# Patient Record
Sex: FEMALE | Race: WHITE | NOT HISPANIC OR LATINO | URBAN - METROPOLITAN AREA
[De-identification: names, ages, dates, MRNs, and addresses within clinical notes are randomized per-mention and may not be internally consistent; named-entity substitution may affect disease eponyms.]

---

## 2021-09-30 ENCOUNTER — EMERGENCY (EMERGENCY)
Facility: HOSPITAL | Age: 31
LOS: 0 days | Discharge: HOME | End: 2021-10-01
Attending: EMERGENCY MEDICINE | Admitting: EMERGENCY MEDICINE
Payer: COMMERCIAL

## 2021-09-30 VITALS
RESPIRATION RATE: 18 BRPM | OXYGEN SATURATION: 99 % | DIASTOLIC BLOOD PRESSURE: 84 MMHG | SYSTOLIC BLOOD PRESSURE: 138 MMHG | HEIGHT: 66 IN | HEART RATE: 96 BPM | TEMPERATURE: 98 F | WEIGHT: 184.09 LBS

## 2021-09-30 DIAGNOSIS — J45.909 UNSPECIFIED ASTHMA, UNCOMPLICATED: ICD-10-CM

## 2021-09-30 DIAGNOSIS — R26.2 DIFFICULTY IN WALKING, NOT ELSEWHERE CLASSIFIED: ICD-10-CM
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
## 2021-09-30 DIAGNOSIS — S90.212A CONTUSION OF LEFT GREAT TOE WITH DAMAGE TO NAIL, INITIAL ENCOUNTER: ICD-10-CM
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
## 2021-09-30 DIAGNOSIS — M79.675 PAIN IN LEFT TOE(S): ICD-10-CM

## 2021-09-30 PROCEDURE — 11740 EVACUATION SUBUNGUAL HMTMA: CPT | Mod: TA

## 2021-09-30 PROCEDURE — 73630 X-RAY EXAM OF FOOT: CPT | Mod: 26,LT

## 2021-09-30 PROCEDURE — 99283 EMERGENCY DEPT VISIT LOW MDM: CPT | Mod: 25

## 2021-09-30 RX ORDER — IBUPROFEN 200 MG
600 TABLET ORAL ONCE
Refills: 0 | Status: COMPLETED | OUTPATIENT
Start: 2021-09-30 | End: 2021-09-30

## 2021-09-30 RX ADMIN — Medication 600 MILLIGRAM(S): at 22:53

## 2021-09-30 NOTE — ED PROVIDER NOTE - NS ED ROS FT
no Constitutional: no fever, chills, no recent weight loss, change in appetite or malaise  MS: see HPI  Neuro: No headache or weakness. No LOC.  Skin: No skin rash.  Endocrine: No history of thyroid disease or diabetes.

## 2021-09-30 NOTE — ED PROVIDER NOTE - NSFOLLOWUPINSTRUCTIONS_ED_ALL_ED_FT
Subungual Hematoma    WHAT YOU NEED TO KNOW:    A subungual hematoma is a collection of blood under your fingernail or toenail.    DISCHARGE INSTRUCTIONS:    Call your doctor if:   •You have increased redness, swelling, or pain.  •You notice pus or a bad smell coming from your nail.  •You see red streaks on your finger or toe that starts from your nail.  •Your nail falls off and there is bleeding.  •You have questions or concerns about your condition or care.    Medicines: You may need any of the following:   •Acetaminophen decreases pain and fever. It is available without a doctor's order. Ask how much to take and how often to take it. Follow directions. Read the labels of all other medicines you are using to see if they also contain acetaminophen, or ask your doctor or pharmacist. Acetaminophen can cause liver damage if not taken correctly. Do not use more than 4 grams (4,000 milligrams) total of acetaminophen in one day.   •NSAIDs, such as ibuprofen, help decrease swelling, pain, and fever. This medicine is available with or without a doctor's order. NSAIDs can cause stomach bleeding or kidney problems in certain people. If you take blood thinner medicine, always ask your healthcare provider if NSAIDs are safe for you. Always read the medicine label and follow directions.  •Take your medicine as directed. Contact your healthcare provider if you think your medicine is not helping or if you have side effects. Tell him of her if you are allergic to any medicine. Keep a list of the medicines, vitamins, and herbs you take. Include the amounts, and when and why you take them. Bring the list or the pill bottles to follow-up visits. Carry your medicine list with you in case of an emergency.    Self-care:   •Apply ice on your finger or toe for 15 to 20 minutes every hour or as directed. Use an ice pack, or put crushed ice in a plastic bag. Cover it with a towel. Ice helps prevent tissue damage and decreases swelling and pain.  •Elevate your finger or toe above the level of your heart as often as you can. This will help decrease swelling and pain. Prop your finger or toe on pillows or blankets to keep it elevated comfortably.   •Gently trim your nail if it begins to fall off in pieces. This may decrease your risk for catching the nail on an object or ripping it off.   Correct way to trim toenails  •Wear comfortable shoes that fit correctly to prevent more injury to your toe.    Follow up with your doctor as directed: Write down your questions so you remember to ask them during your visits.

## 2021-09-30 NOTE — ED ADULT NURSE NOTE - NSIMPLEMENTINTERV_GEN_ALL_ED
Implemented All Universal Safety Interventions:  [place] to call system. Call bell, personal items and telephone within reach. Instruct patient to call for assistance. Room bathroom lighting operational. Non-slip footwear when patient is off stretcher. Physically safe environment: no spills, clutter or unnecessary equipment. Stretcher in lowest position, wheels locked, appropriate side rails in place.

## 2021-09-30 NOTE — ED PROVIDER NOTE - PATIENT PORTAL LINK FT
You can access the FollowMyHealth Patient Portal offered by [facility] by registering at the following website: [email or web address]. By joining [employer]’s FollowMyHealth portal, you will also be able to view your health information using other applications (apps) compatible with our system.

## 2021-09-30 NOTE — ED ADULT NURSE NOTE - CAS DISCH ACCOMP BY
Appointment rescheduled to 8/1/18 to discuss meds. Patient aware, states understanding [facility]/MA     uncle

## 2021-09-30 NOTE — ED PROVIDER NOTE - PHYSICAL EXAMINATION
CONSTITUTIONAL: Well-appearing; well-nourished; in no apparent distress.   MS: painful ROM to left big toe. +tenderness to left big toe. 2+ DP pulses. left foot nv intact.    SKIN: ~75% subungual hematoma noted under left bog toe toenail.   NEURO/PSYCH: A & O x 4; grossly unremarkable.

## 2021-09-30 NOTE — ED PROVIDER NOTE - CLINICAL SUMMARY MEDICAL DECISION MAKING FREE TEXT BOX
Healthy 29 yo F here for L great toe pain, subungual hematoma after dropping an iron candlestick on her toe -- XR negative, trephonation performed with evacuation of large subungual. Pain improved. Advised on ice, rest, follow up, return precautions.

## 2021-09-30 NOTE — ED PROVIDER NOTE - OBJECTIVE STATEMENT
29 yo female no sig hx present c/o left great toe pain for 2 hours s/p she dropped a heavy candle paula on it. pain worsen with movement and ambulation. denies denies numbness/weakness to left foot.

## 2021-10-01 NOTE — ED PROCEDURE NOTE - GENERAL PROCEDURE DETAILS
area cleansed with betadine. digital block used for anesthesia. trephination to left big toe toenail. hematoma released. covered with clean dry dressing.

## 2021-10-26 ENCOUNTER — EMERGENCY (EMERGENCY)
Facility: HOSPITAL | Age: 31
LOS: 0 days | Discharge: HOME | End: 2021-10-26
Attending: EMERGENCY MEDICINE | Admitting: EMERGENCY MEDICINE
Payer: COMMERCIAL

## 2021-10-26 VITALS
TEMPERATURE: 98 F | HEART RATE: 68 BPM | DIASTOLIC BLOOD PRESSURE: 62 MMHG | SYSTOLIC BLOOD PRESSURE: 102 MMHG | RESPIRATION RATE: 18 BRPM | OXYGEN SATURATION: 96 %

## 2021-10-26 VITALS
HEART RATE: 80 BPM | TEMPERATURE: 98 F | DIASTOLIC BLOOD PRESSURE: 89 MMHG | OXYGEN SATURATION: 99 % | SYSTOLIC BLOOD PRESSURE: 136 MMHG | RESPIRATION RATE: 18 BRPM | HEIGHT: 66 IN

## 2021-10-26 DIAGNOSIS — J35.1 HYPERTROPHY OF TONSILS: ICD-10-CM

## 2021-10-26 DIAGNOSIS — Z91.018 ALLERGY TO OTHER FOODS: ICD-10-CM

## 2021-10-26 DIAGNOSIS — R05.8 OTHER SPECIFIED COUGH: ICD-10-CM

## 2021-10-26 DIAGNOSIS — J45.909 UNSPECIFIED ASTHMA, UNCOMPLICATED: ICD-10-CM

## 2021-10-26 DIAGNOSIS — J02.9 ACUTE PHARYNGITIS, UNSPECIFIED: ICD-10-CM

## 2021-10-26 DIAGNOSIS — R42 DIZZINESS AND GIDDINESS: ICD-10-CM

## 2021-10-26 LAB
ALBUMIN SERPL ELPH-MCNC: 4.5 G/DL — SIGNIFICANT CHANGE UP (ref 3.5–5.2)
ALP SERPL-CCNC: 78 U/L — SIGNIFICANT CHANGE UP (ref 30–115)
ALT FLD-CCNC: 45 U/L — HIGH (ref 0–41)
ANION GAP SERPL CALC-SCNC: 15 MMOL/L — HIGH (ref 7–14)
AST SERPL-CCNC: 16 U/L — SIGNIFICANT CHANGE UP (ref 0–41)
BASOPHILS # BLD AUTO: 0.04 K/UL — SIGNIFICANT CHANGE UP (ref 0–0.2)
BASOPHILS NFR BLD AUTO: 0.3 % — SIGNIFICANT CHANGE UP (ref 0–1)
BILIRUB SERPL-MCNC: 0.4 MG/DL — SIGNIFICANT CHANGE UP (ref 0.2–1.2)
BUN SERPL-MCNC: 12 MG/DL — SIGNIFICANT CHANGE UP (ref 10–20)
CALCIUM SERPL-MCNC: 9.5 MG/DL — SIGNIFICANT CHANGE UP (ref 8.5–10.1)
CHLORIDE SERPL-SCNC: 100 MMOL/L — SIGNIFICANT CHANGE UP (ref 98–110)
CO2 SERPL-SCNC: 28 MMOL/L — SIGNIFICANT CHANGE UP (ref 17–32)
CREAT SERPL-MCNC: 0.8 MG/DL — SIGNIFICANT CHANGE UP (ref 0.7–1.5)
EOSINOPHIL # BLD AUTO: 0.07 K/UL — SIGNIFICANT CHANGE UP (ref 0–0.7)
EOSINOPHIL NFR BLD AUTO: 0.5 % — SIGNIFICANT CHANGE UP (ref 0–8)
GLUCOSE SERPL-MCNC: 99 MG/DL — SIGNIFICANT CHANGE UP (ref 70–99)
HCT VFR BLD CALC: 45.1 % — SIGNIFICANT CHANGE UP (ref 37–47)
HGB BLD-MCNC: 14.6 G/DL — SIGNIFICANT CHANGE UP (ref 12–16)
IMM GRANULOCYTES NFR BLD AUTO: 0.7 % — HIGH (ref 0.1–0.3)
LYMPHOCYTES # BLD AUTO: 34.2 % — SIGNIFICANT CHANGE UP (ref 20.5–51.1)
LYMPHOCYTES # BLD AUTO: 4.41 K/UL — HIGH (ref 1.2–3.4)
MCHC RBC-ENTMCNC: 29.2 PG — SIGNIFICANT CHANGE UP (ref 27–31)
MCHC RBC-ENTMCNC: 32.4 G/DL — SIGNIFICANT CHANGE UP (ref 32–37)
MCV RBC AUTO: 90.2 FL — SIGNIFICANT CHANGE UP (ref 81–99)
MONOCYTES # BLD AUTO: 0.86 K/UL — HIGH (ref 0.1–0.6)
MONOCYTES NFR BLD AUTO: 6.7 % — SIGNIFICANT CHANGE UP (ref 1.7–9.3)
NEUTROPHILS # BLD AUTO: 7.43 K/UL — HIGH (ref 1.4–6.5)
NEUTROPHILS NFR BLD AUTO: 57.6 % — SIGNIFICANT CHANGE UP (ref 42.2–75.2)
NRBC # BLD: 0 /100 WBCS — SIGNIFICANT CHANGE UP (ref 0–0)
PLATELET # BLD AUTO: 281 K/UL — SIGNIFICANT CHANGE UP (ref 130–400)
POTASSIUM SERPL-MCNC: 3.2 MMOL/L — LOW (ref 3.5–5)
POTASSIUM SERPL-SCNC: 3.2 MMOL/L — LOW (ref 3.5–5)
PROT SERPL-MCNC: 7.6 G/DL — SIGNIFICANT CHANGE UP (ref 6–8)
RBC # BLD: 5 M/UL — SIGNIFICANT CHANGE UP (ref 4.2–5.4)
RBC # FLD: 12.1 % — SIGNIFICANT CHANGE UP (ref 11.5–14.5)
SODIUM SERPL-SCNC: 143 MMOL/L — SIGNIFICANT CHANGE UP (ref 135–146)
WBC # BLD: 12.9 K/UL — HIGH (ref 4.8–10.8)
WBC # FLD AUTO: 12.9 K/UL — HIGH (ref 4.8–10.8)

## 2021-10-26 PROCEDURE — 70450 CT HEAD/BRAIN W/O DYE: CPT | Mod: 26,MA

## 2021-10-26 PROCEDURE — 99285 EMERGENCY DEPT VISIT HI MDM: CPT

## 2021-10-26 RX ORDER — ONDANSETRON 8 MG/1
4 TABLET, FILM COATED ORAL ONCE
Refills: 0 | Status: COMPLETED | OUTPATIENT
Start: 2021-10-26 | End: 2021-10-26

## 2021-10-26 RX ORDER — ONDANSETRON 8 MG/1
4 TABLET, FILM COATED ORAL ONCE
Refills: 0 | Status: DISCONTINUED | OUTPATIENT
Start: 2021-10-26 | End: 2021-10-26

## 2021-10-26 RX ORDER — DEXAMETHASONE 0.5 MG/5ML
10 ELIXIR ORAL ONCE
Refills: 0 | Status: COMPLETED | OUTPATIENT
Start: 2021-10-26 | End: 2021-10-26

## 2021-10-26 RX ORDER — KETOROLAC TROMETHAMINE 30 MG/ML
15 SYRINGE (ML) INJECTION ONCE
Refills: 0 | Status: DISCONTINUED | OUTPATIENT
Start: 2021-10-26 | End: 2021-10-26

## 2021-10-26 RX ORDER — MECLIZINE HCL 12.5 MG
50 TABLET ORAL ONCE
Refills: 0 | Status: COMPLETED | OUTPATIENT
Start: 2021-10-26 | End: 2021-10-26

## 2021-10-26 RX ORDER — ACETAMINOPHEN 500 MG
975 TABLET ORAL ONCE
Refills: 0 | Status: COMPLETED | OUTPATIENT
Start: 2021-10-26 | End: 2021-10-26

## 2021-10-26 RX ORDER — SODIUM CHLORIDE 9 MG/ML
2000 INJECTION, SOLUTION INTRAVENOUS ONCE
Refills: 0 | Status: COMPLETED | OUTPATIENT
Start: 2021-10-26 | End: 2021-10-26

## 2021-10-26 RX ORDER — DIPHENHYDRAMINE HCL 50 MG
25 CAPSULE ORAL ONCE
Refills: 0 | Status: DISCONTINUED | OUTPATIENT
Start: 2021-10-26 | End: 2021-10-26

## 2021-10-26 RX ORDER — POTASSIUM CHLORIDE 20 MEQ
30 PACKET (EA) ORAL ONCE
Refills: 0 | Status: COMPLETED | OUTPATIENT
Start: 2021-10-26 | End: 2021-10-26

## 2021-10-26 RX ADMIN — ONDANSETRON 4 MILLIGRAM(S): 8 TABLET, FILM COATED ORAL at 14:27

## 2021-10-26 RX ADMIN — Medication 30 MILLIEQUIVALENT(S): at 19:30

## 2021-10-26 RX ADMIN — Medication 102 MILLIGRAM(S): at 14:27

## 2021-10-26 RX ADMIN — Medication 50 MILLIGRAM(S): at 18:31

## 2021-10-26 RX ADMIN — Medication 975 MILLIGRAM(S): at 19:22

## 2021-10-26 RX ADMIN — SODIUM CHLORIDE 2000 MILLILITER(S): 9 INJECTION, SOLUTION INTRAVENOUS at 14:27

## 2021-10-26 RX ADMIN — Medication 15 MILLIGRAM(S): at 14:27

## 2021-10-26 NOTE — ED PROVIDER NOTE - NS ED ROS FT
CONST: No fever, chills or bodyaches  EYES: No pain, redness, drainage or visual changes.  ENT: + sore throat. No ear pain or discharge, nasal discharge or congestion  CARD: No chest pain, palpitations  RESP: + dry cough. No SOB, hemoptysis.  GI: No abdominal pain, N/V/D  MS: No joint pain, back pain or extremity pain/injury  SKIN: No rashes  NEURO: No headache, dizziness, paresthesias

## 2021-10-26 NOTE — ED PROVIDER NOTE - PHYSICAL EXAMINATION
CONST: Well appearing in NAD  EYES: Sclera and conjunctiva clear.  ENT: mild erythema posterior pharynx, no exudates, uvula midline, clear speech, tolerating secretions, no trismus, TM's clear B/L without drainage.   NECK: Non-tender, no meningeal signs, supple, + anterior cervical lymphadenopathy   CARD: Normal S1 S2; Normal rate and rhythm  RESP: Equal BS B/L, No wheezes, rhonchi or rales. No distress  MS: Normal ROM in all extremities. no calf pain, radial pulses 2+ bilaterally  SKIN: Warm, dry, no acute rashes. Good turgor  NEURO: A&Ox3, No focal deficits. Strength 5/5 with no sensory deficits

## 2021-10-26 NOTE — ED PROVIDER NOTE - PATIENT PORTAL LINK FT
You can access the FollowMyHealth Patient Portal offered by [facility] by registering at the following website: [email or web address]. By joining [employer]’s FollowMyHealth portal, you will also be able to view your health information using other applications (apps) compatible with our system.

## 2021-10-26 NOTE — ED PROVIDER NOTE - CLINICAL SUMMARY MEDICAL DECISION MAKING FREE TEXT BOX
31yF pw sore throat x 1 week currently on amoxcillin  and  prednisone  with persistent pain   in ed  well appearing nontoxic   pharynx  left tonsil enlargement -  (Chronic) no exudate  n parapharyngeal  abscess uvula  midline  no  change in voice  no  neck pain with movement  Pt felt better  after  symptomatic treatment in  ED.   labs reviewed  wbc wnl,   Pt had episode of dizziness after meds ,  Ct head no acute findings  Patient to be discharged from ED in well appearing condition. Any available test results were discussed with and printed  for patient.  Verbal instructions given, including instructions to return to ED immediately for any new, worsening, or concerning symptoms. Limitations of ED work up discussed.  Patient reports understanding of above with capacity and insight. Written discharge instructions additionally given, including follow-up plan.

## 2021-10-26 NOTE — ED PROVIDER NOTE - OBJECTIVE STATEMENT
31 y.o female presents to the ED for evaluation of sore throat x 1 week.  Went to urgent care, given amox.  2 days later was started on prednisone. Today pain worse, had [facility] health appointment and was told to come to the ED for further eval.  Denies fever, chills, trismus, difficulty tolerating secretions, facial swelling, dental pain, changes in speech.

## 2021-10-26 NOTE — ED PROVIDER NOTE - NSFOLLOWUPINSTRUCTIONS_ED_ALL_ED_FT
Detail Level: Detailed Detail Level: Zone Sore Throat  ImageWhen you have a sore throat, your throat may:    Hurt.  Burn.  Feel irritated.  Feel scratchy.    Many things can cause a sore throat, including:    An infection.  Allergies.  Dryness in the air.  Smoke or pollution.  Gastroesophageal reflux disease (GERD).  A tumor.    A sore throat can be the first sign of another sickness. It can happen with other problems, like coughing or a fever. Most sore throats go away without treatment.    Follow these instructions at home:  Take over-the-counter medicines only as told by your doctor.  Drink enough fluids to keep your pee (urine) clear or pale yellow.  Rest when you feel you need to.  To help with pain, try:    Sipping warm liquids, such as broth, herbal tea, or warm water.  Eating or drinking cold or frozen liquids, such as frozen ice pops.  Gargling with a salt-water mixture 3–4 times a day or as needed. To make a salt-water mixture, add ½–1 tsp of salt in 1 cup of warm water. Mix it until you cannot see the salt anymore.  Sucking on hard candy or throat lozenges.  Putting a cool-mist humidifier in your bedroom at night.  Sitting in the bathroom with the door closed for 5–10 minutes while you run hot water in the shower.    Do not use any tobacco products, such as cigarettes, chewing tobacco, and e-cigarettes. If you need help quitting, ask your doctor.  Contact a doctor if:  You have a fever for more than 2–3 days.  You keep having symptoms for more than 2–3 days.  Your throat does not get better in 7 days.  You have a fever and your symptoms suddenly get worse.  Get help right away if:  You have trouble breathing.  You cannot swallow fluids, soft foods, or your saliva.  You have swelling in your throat or neck that gets worse.  You keep feeling like you are going to throw up (vomit).  You keep throwing up.  This information is not intended to replace advice given to you by your health care provider. Make sure you discuss any questions you have with your health care provider        Dizziness  Dizziness is a common problem. It is a feeling of unsteadiness or light-headedness. You may feel like you are about to faint. Dizziness can lead to injury if you stumble or fall. Anyone can become dizzy, but dizziness is more common in older adults. This condition can be caused by a number of things, including medicines, dehydration, or illness.    Follow these instructions at home:  Eating and drinking     Drink enough fluid to keep your urine clear or pale yellow. This helps to keep you from becoming dehydrated. Try to drink more clear fluids, such as water.  Do not drink alcohol.  Limit your caffeine intake if told to do so by your health care provider. Check ingredients and nutrition facts to see if a food or beverage contains caffeine.  Limit your salt (sodium) intake if told to do so by your health care provider. Check ingredients and nutrition facts to see if a food or beverage contains sodium.  Activity     Avoid making quick movements.    Rise slowly from chairs and steady yourself until you feel okay.  In the morning, first sit up on the side of the bed. When you feel okay, stand slowly while you hold onto something until you know that your balance is fine.    If you need to  one place for a long time, move your legs often. Tighten and relax the muscles in your legs while you are standing.  Do not drive or use heavy machinery if you feel dizzy.  Avoid bending down if you feel dizzy. Place items in your home so that they are easy for you to reach without leaning over.  Lifestyle     Do not use any products that contain nicotine or tobacco, such as cigarettes and e-cigarettes. If you need help quitting, ask your health care provider.  Try to reduce your stress level by using methods such as yoga or meditation. Talk with your health care provider if you need help to manage your stress.  General instructions     Watch your dizziness for any changes.  Take over-the-counter and prescription medicines only as told by your health care provider. Talk with your health care provider if you think that your dizziness is caused by a medicine that you are taking.  Tell a friend or a family member that you are feeling dizzy. If he or she notices any changes in your behavior, have this person call your health care provider.  Keep all follow-up visits as told by your health care provider. This is important.  Contact a health care provider if:  Your dizziness does not go away.  Your dizziness or light-headedness gets worse.  You feel nauseous.  You have reduced hearing.  You have new symptoms.  You are unsteady on your feet or you feel like the room is spinning.  Get help right away if:  You vomit or have diarrhea and are unable to eat or drink anything.  You have problems talking, walking, swallowing, or using your arms, hands, or legs.  You feel generally weak.  You are not thinking clearly or you have trouble forming sentences. It may take a friend or family member to notice this.  You have chest pain, abdominal pain, shortness of breath, or sweating.  Your vision changes.  You have any bleeding.  You have a severe headache.  You have neck pain or a stiff neck.  You have a fever.  These symptoms may represent a serious problem that is an emergency. Do not wait to see if the symptoms will go away. Get medical help right away. Call your local emergency services (911 in the [place] Do not drive yourself to the hospital.     Summary  Dizziness is a feeling of unsteadiness or light-headedness. This condition can be caused by a number of things, including medicines, dehydration, or illness.  Anyone can become dizzy, but dizziness is more common in older adults.  Drink enough fluid to keep your urine clear or pale yellow. Do not drink alcohol.  Avoid making quick movements if you feel dizzy. Monitor your dizziness for any changes.  This information is not intended to replace advice given to you by your health care provider. Make sure you discuss any questions you have with your health care provider.    Document Released: 06/13/2002 Document Revised: 01/20/2018 Document Reviewed: 01/20/2018  [employer] Interactive Patient Education © 2018 [employer]

## 2022-03-28 ENCOUNTER — EMERGENCY (EMERGENCY)
Facility: HOSPITAL | Age: 32
LOS: 0 days | Discharge: HOME | End: 2022-03-28
Attending: EMERGENCY MEDICINE | Admitting: EMERGENCY MEDICINE
Payer: COMMERCIAL

## 2022-03-28 VITALS
RESPIRATION RATE: 18 BRPM | DIASTOLIC BLOOD PRESSURE: 96 MMHG | HEIGHT: 66 IN | SYSTOLIC BLOOD PRESSURE: 134 MMHG | OXYGEN SATURATION: 100 % | TEMPERATURE: 97 F | HEART RATE: 116 BPM

## 2022-03-28 DIAGNOSIS — L50.9 URTICARIA, UNSPECIFIED: ICD-10-CM
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
## 2022-03-28 DIAGNOSIS — Z91.018 ALLERGY TO OTHER FOODS: ICD-10-CM

## 2022-03-28 DIAGNOSIS — H93.90 UNSPECIFIED DISORDER OF EAR, UNSPECIFIED EAR: ICD-10-CM
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
## 2022-03-28 DIAGNOSIS — T78.40XA ALLERGY, UNSPECIFIED, INITIAL ENCOUNTER: ICD-10-CM

## 2022-03-28 DIAGNOSIS — J45.909 UNSPECIFIED ASTHMA, UNCOMPLICATED: ICD-10-CM

## 2022-03-28 PROCEDURE — 99284 EMERGENCY DEPT VISIT MOD MDM: CPT

## 2022-03-28 RX ORDER — HYDROCORTISONE 1 %
1 OINTMENT (GRAM) TOPICAL
Qty: 15 | Refills: 0
Start: 2022-03-28 | End: 2022-04-01

## 2022-03-28 RX ORDER — CETIRIZINE HYDROCHLORIDE 10 MG/1
1 TABLET ORAL
Qty: 10 | Refills: 0
Start: 2022-03-28 | End: 2022-04-06

## 2022-03-28 RX ADMIN — Medication 60 MILLIGRAM(S): at 15:41

## 2022-03-28 NOTE — ED PROVIDER NOTE - OBJECTIVE STATEMENT
this is a 32 yo female presents to ed for possible allergic reaction. patient states she was treated in urgent care of friday after her earlobe was swollen. patient was given antibiotics. patient states today the other ear is swollen and she feels itchy.

## 2022-03-28 NOTE — ED PROVIDER NOTE - CARE PROVIDER_API CALL
Leidy Urena)  Allergy and Immunology; Internal Medicine  [address] [place] [address]  Phone: [phone]  Fax: [phone]  Follow Up Time:

## 2022-03-28 NOTE — ED PROVIDER NOTE - NS ED ATTENDING STATEMENT MOD
This was a shared visit with the [facility]. I reviewed and verified the documentation and independently performed the documented:

## 2022-03-28 NOTE — ED PROVIDER NOTE - PATIENT PORTAL LINK FT
You can access the FollowMyHealth Patient Portal offered by [facility] by registering at the following website: [email or web address]. By joining [employer]’s FollowMyHealth portal, you will also be able to view your health information using other applications (apps) compatible with our system.

## 2022-03-28 NOTE — ED PROVIDER NOTE - NSFOLLOWUPINSTRUCTIONS_ED_ALL_ED_FT
Allergic Reaction    An allergic reaction is an abnormal reaction to a substance (allergen) by the body's defense system. Common allergens include medicines, food, insect bites or stings, and blood products. The body releases certain proteins into the blood that can cause a variety of symptoms such as an itchy rash, wheezing, swelling of the face/lips/tongue/throat, abdominal pain, nausea or vomiting. An allergic reaction is usually treated with medication. If your health care provider prescribed you an epinephrine injection device, make sure to keep it with you at all times.    SEEK IMMEDIATE MEDICAL CARE IF YOU HAVE THE FOLLOWING SYMPTOMS: allergic reaction severe enough that required you to use epinephrine, tightness in your chest, swelling around your lips/tongue/throat, abdominal pain, vomiting or diarrhea, or lightheadedness/dizziness. These symptoms may represent a serious problem that is an emergency. Do not wait to see if the symptoms will go away. Use your auto-injector pen or anaphylaxis kit as you have been instructed, and get medical help right away. Call your local emergency services (911 in the [place] Do not drive yourself to the hospital.          STOP ANTIBIOTICS  AND START PREDNISONE.    YOU CAN BUY ZYRTEC OVER THE COUNTER     YOU CAN BUY HYDROCORTISONE CREAM OVER THE COUNTER.

## 2022-03-28 NOTE — ED PROVIDER NOTE - NS ED ROS FT
Review of Systems:  	•	CONSTITUTIONAL - no fever, no diaphoresis, no chills  	•	SKIN -hives    	•	EYES - no eye pain, no blurry vision  	•	ENT - no change in hearing, no sore throat, no ear pain or tinnitus  	•	RESPIRATORY - no shortness of breath, no cough  	•	CARDIAC - no chest pain, no palpitations

## 2022-03-28 NOTE — ED PROVIDER NOTE - PHYSICAL EXAMINATION
--EXAM--  VITAL SIGNS: I have reviewed vs documented at present.  CONSTITUTIONAL: Well-developed; well-nourished; in no acute distress.   SKIN: hives to face

## 2022-03-28 NOTE — ED PROVIDER NOTE - ATTENDING CONTRIBUTION TO CARE
Patient developed swelling of the left ear several days ago was seen in an urgent care center.  The providers at the urgent care center believe that this was an infectious process.  P.o. Keflex and Bactrim were started.  This therapy was complicated by anorexia.  Today the patient developed swelling of the right ear and hives and scattered areas of the scalp.  She denies shortness of breath, chest pain, neck tightness, difficulty swallowing.  She denies new cosmetic or shampoo products.  To she denies exposure to pets.  She denies new medications.  She denies new foods.  She did recently travel to [place].  Vital signs noted.  NAD.  Urticarial eruption on the scalp and periauricular area.  The right ear is mildly swelling with scant redness.  There is no purulence or induration.  No sign of fluctuance.  The left ear has much more less swelling and has no redness.  The external canals are intact bilaterally.  And the neck shows no stridor.  The rest of the skin exam is free of urticaria.  Zyrtec, hydrocortisone cream, p.o. prednisone ordered.

## 2024-08-14 ENCOUNTER — EMERGENCY (EMERGENCY)
Facility: HOSPITAL | Age: 34
LOS: 0 days | Discharge: HOME | End: 2024-08-15
Attending: EMERGENCY MEDICINE
Payer: COMMERCIAL

## 2024-08-14 VITALS
RESPIRATION RATE: 18 BRPM | TEMPERATURE: 98 F | HEIGHT: 66 IN | DIASTOLIC BLOOD PRESSURE: 83 MMHG | SYSTOLIC BLOOD PRESSURE: 130 MMHG | WEIGHT: 150.58 LBS | HEART RATE: 98 BPM | OXYGEN SATURATION: 99 %

## 2024-08-14 DIAGNOSIS — B34.9 VIRAL INFECTION, UNSPECIFIED: ICD-10-CM

## 2024-08-14 DIAGNOSIS — Z91.018 ALLERGY TO OTHER FOODS: ICD-10-CM

## 2024-08-14 DIAGNOSIS — R53.1 WEAKNESS: ICD-10-CM

## 2024-08-14 DIAGNOSIS — R55 SYNCOPE AND COLLAPSE: ICD-10-CM

## 2024-08-14 DIAGNOSIS — R05.9 COUGH, UNSPECIFIED: ICD-10-CM

## 2024-08-14 LAB
ALBUMIN SERPL ELPH-MCNC: 4.4 G/DL — SIGNIFICANT CHANGE UP (ref 3.5–5.2)
ALP SERPL-CCNC: 68 U/L — SIGNIFICANT CHANGE UP (ref 30–115)
ALT FLD-CCNC: 12 U/L — SIGNIFICANT CHANGE UP (ref 0–41)
ANION GAP SERPL CALC-SCNC: 10 MMOL/L — SIGNIFICANT CHANGE UP (ref 7–14)
AST SERPL-CCNC: 14 U/L — SIGNIFICANT CHANGE UP (ref 0–41)
BASOPHILS # BLD AUTO: 0.04 K/UL — SIGNIFICANT CHANGE UP (ref 0–0.2)
BASOPHILS NFR BLD AUTO: 0.3 % — SIGNIFICANT CHANGE UP (ref 0–1)
BILIRUB SERPL-MCNC: 0.4 MG/DL — SIGNIFICANT CHANGE UP (ref 0.2–1.2)
BUN SERPL-MCNC: 12 MG/DL — SIGNIFICANT CHANGE UP (ref 10–20)
CALCIUM SERPL-MCNC: 9.4 MG/DL — SIGNIFICANT CHANGE UP (ref 8.4–10.5)
CHLORIDE SERPL-SCNC: 106 MMOL/L — SIGNIFICANT CHANGE UP (ref 98–110)
CO2 SERPL-SCNC: 24 MMOL/L — SIGNIFICANT CHANGE UP (ref 17–32)
CREAT SERPL-MCNC: 0.7 MG/DL — SIGNIFICANT CHANGE UP (ref 0.7–1.5)
EGFR: 118 ML/MIN/1.73M2 — SIGNIFICANT CHANGE UP
EOSINOPHIL # BLD AUTO: 0.14 K/UL — SIGNIFICANT CHANGE UP (ref 0–0.7)
EOSINOPHIL NFR BLD AUTO: 1 % — SIGNIFICANT CHANGE UP (ref 0–8)
GLUCOSE SERPL-MCNC: 87 MG/DL — SIGNIFICANT CHANGE UP (ref 70–99)
HCT VFR BLD CALC: 41 % — SIGNIFICANT CHANGE UP (ref 37–47)
HGB BLD-MCNC: 13.8 G/DL — SIGNIFICANT CHANGE UP (ref 12–16)
IMM GRANULOCYTES NFR BLD AUTO: 0.6 % — HIGH (ref 0.1–0.3)
LYMPHOCYTES # BLD AUTO: 26.9 % — SIGNIFICANT CHANGE UP (ref 20.5–51.1)
LYMPHOCYTES # BLD AUTO: 3.83 K/UL — HIGH (ref 1.2–3.4)
MCHC RBC-ENTMCNC: 31.2 PG — HIGH (ref 27–31)
MCHC RBC-ENTMCNC: 33.7 G/DL — SIGNIFICANT CHANGE UP (ref 32–37)
MCV RBC AUTO: 92.8 FL — SIGNIFICANT CHANGE UP (ref 81–99)
MONOCYTES # BLD AUTO: 1.07 K/UL — HIGH (ref 0.1–0.6)
MONOCYTES NFR BLD AUTO: 7.5 % — SIGNIFICANT CHANGE UP (ref 1.7–9.3)
NEUTROPHILS # BLD AUTO: 9.07 K/UL — HIGH (ref 1.4–6.5)
NEUTROPHILS NFR BLD AUTO: 63.7 % — SIGNIFICANT CHANGE UP (ref 42.2–75.2)
NRBC # BLD: 0 /100 WBCS — SIGNIFICANT CHANGE UP (ref 0–0)
PLATELET # BLD AUTO: 267 K/UL — SIGNIFICANT CHANGE UP (ref 130–400)
PMV BLD: 9.9 FL — SIGNIFICANT CHANGE UP (ref 7.4–10.4)
POTASSIUM SERPL-MCNC: 4 MMOL/L — SIGNIFICANT CHANGE UP (ref 3.5–5)
POTASSIUM SERPL-SCNC: 4 MMOL/L — SIGNIFICANT CHANGE UP (ref 3.5–5)
PROT SERPL-MCNC: 6.9 G/DL — SIGNIFICANT CHANGE UP (ref 6–8)
RBC # BLD: 4.42 M/UL — SIGNIFICANT CHANGE UP (ref 4.2–5.4)
RBC # FLD: 12.4 % — SIGNIFICANT CHANGE UP (ref 11.5–14.5)
SODIUM SERPL-SCNC: 140 MMOL/L — SIGNIFICANT CHANGE UP (ref 135–146)
WBC # BLD: 14.23 K/UL — HIGH (ref 4.8–10.8)
WBC # FLD AUTO: 14.23 K/UL — HIGH (ref 4.8–10.8)

## 2024-08-14 PROCEDURE — 99285 EMERGENCY DEPT VISIT HI MDM: CPT

## 2024-08-14 PROCEDURE — 84703 CHORIONIC GONADOTROPIN ASSAY: CPT

## 2024-08-14 PROCEDURE — 82550 ASSAY OF CK (CPK): CPT

## 2024-08-14 PROCEDURE — 96374 THER/PROPH/DIAG INJ IV PUSH: CPT

## 2024-08-14 PROCEDURE — 80053 COMPREHEN METABOLIC PANEL: CPT

## 2024-08-14 PROCEDURE — 93010 ELECTROCARDIOGRAM REPORT: CPT

## 2024-08-14 PROCEDURE — 83735 ASSAY OF MAGNESIUM: CPT

## 2024-08-14 PROCEDURE — 85025 COMPLETE CBC W/AUTO DIFF WBC: CPT

## 2024-08-14 PROCEDURE — 93005 ELECTROCARDIOGRAM TRACING: CPT

## 2024-08-14 PROCEDURE — 71046 X-RAY EXAM CHEST 2 VIEWS: CPT

## 2024-08-14 PROCEDURE — 99285 EMERGENCY DEPT VISIT HI MDM: CPT | Mod: 25

## 2024-08-14 PROCEDURE — 36415 COLL VENOUS BLD VENIPUNCTURE: CPT

## 2024-08-14 RX ORDER — BACTERIOSTATIC SODIUM CHLORIDE 0.9 %
1000 VIAL (ML) INJECTION ONCE
Refills: 0 | Status: COMPLETED | OUTPATIENT
Start: 2024-08-14 | End: 2024-08-14

## 2024-08-14 RX ORDER — ACETAMINOPHEN 500 MG
650 TABLET ORAL ONCE
Refills: 0 | Status: COMPLETED | OUTPATIENT
Start: 2024-08-14 | End: 2024-08-14

## 2024-08-14 RX ADMIN — Medication 650 MILLIGRAM(S): at 23:00

## 2024-08-14 RX ADMIN — Medication 2000 MILLILITER(S): at 23:00

## 2024-08-14 NOTE — ED ADULT TRIAGE NOTE - CHIEF COMPLAINT QUOTE
Pt presents to the Ed w/ c/o of heat stroke. [place]. After she got home she has felt consistently dizzy, weak and dehydrated

## 2024-08-15 LAB
CK SERPL-CCNC: 51 U/L — SIGNIFICANT CHANGE UP (ref 0–225)
HCG SERPL QL: NEGATIVE — SIGNIFICANT CHANGE UP
MAGNESIUM SERPL-MCNC: 2.2 MG/DL — SIGNIFICANT CHANGE UP (ref 1.8–2.4)

## 2024-08-15 PROCEDURE — 71046 X-RAY EXAM CHEST 2 VIEWS: CPT | Mod: 26

## 2024-08-15 RX ORDER — KETOROLAC TROMETHAMINE 10 MG
15 TABLET ORAL ONCE
Refills: 0 | Status: DISCONTINUED | OUTPATIENT
Start: 2024-08-15 | End: 2024-08-15

## 2024-08-15 RX ADMIN — Medication 15 MILLIGRAM(S): at 03:50

## 2024-08-15 NOTE — ED PROVIDER NOTE - NSFOLLOWUPINSTRUCTIONS_ED_ALL_ED_FT
Please follow-up with PCP in 1 to 3 days. Return precautions explained in full to patient/family.    Our Emergency Department Referral Coordinators will be reaching out to you in the next 24-48 hours from 9:00am to 5:00pm with a follow up appointment. Please expect a phone call from the hospital in that time frame. If you do not receive a call or if you have any questions or concerns, you can reach them at   [phone] [phone]).    Viral Illness, Adult    Viruses are tiny germs that can get into a person's body and cause illness. There are many different types of viruses, and they cause many types of illness. Viral illnesses can range from mild to severe. They can affect various parts of the body.    Short-term conditions that are caused by a virus include colds and the flu (influenza). Long-term conditions that are caused by a virus include herpes, shingles, and HIV (human immunodeficiency virus) infection. A few viruses have been linked to certain cancers.    What are the causes?  Many types of viruses can cause illness. Viruses invade cells in your body, multiply, and cause the infected cells to work abnormally or die. When these cells die, they release more of the virus. When this happens, you develop symptoms of the illness, and the virus continues to spread to other cells. If the virus takes over the function of the cell, it can cause the cell to divide and grow out of control. This happens when a virus causes cancer.    Different viruses get into the body in different ways. You can get a virus by:  - Swallowing food or water that has come in contact with the virus (is contaminated).  - Breathing in droplets that have been coughed or sneezed into the air by an infected person.  - Touching a surface that has been contaminated with the virus and then touching your eyes, nose, or mouth.  - Being bitten by an insect or animal that carries the virus.  - Having sexual contact with a person who is infected with the virus.  - Being exposed to blood or fluids that contain the virus, either through an open cut or during a transfusion.    If a virus enters your body, your body's defense system (immune system) will try to fight the virus. You may be at higher risk for a viral illness if your immune system is weak.    What are the signs or symptoms?  You may have these symptoms, depending on the type of virus and the location of the cells that it invades:  - Cold and flu viruses:  -- Fever.  -- Headache.  -- Sore throat.  -- Muscle aches.  -- Stuffy nose (nasal congestion).  -- Cough.  -- Digestive system (gastrointestinal) viruses:  -- Fever.  -- Pain in the abdomen.  -- Nausea.  -- Diarrhea.  - Liver viruses (hepatitis):  -- Loss of appetite.  -- Tiredness.  -- Skin or the white parts of your eyes turning yellow (jaundice).  - Brain and spinal cord viruses:  -- Fever.  -- Headache.  -- Stiff neck.  -- Nausea and vomiting.  -- Confusion or sleepiness.  - Skin viruses:  -- Warts.  -- Itching.  -- Rash.  - Sexually transmitted viruses:  -- Discharge.  -- Swelling.  -- Redness.  -- Rash.    How is this diagnosed?  This condition may be diagnosed based on one or more of the following:  - Symptoms.  - Medical history.  - Physical exam.  - Blood test, sample of mucus from your lungs (sputum sample), stool sample, or a swab of body fluids or a skin sore (lesion).    How is this treated?  Viruses can be hard to treat because they live within cells. Antibiotic medicines do not treat viruses because these medicines do not get inside cells. Treatment for a viral illness may include:  - Resting and drinking plenty of fluids.  - Medicines to relieve symptoms. These can include over-the-counter medicine for pain and fever, medicines for cough or congestion, and medicines to relieve diarrhea.  - Antiviral medicines. These medicines are available only for certain types of viruses.  Some viral illnesses can be prevented with vaccinations. A common example is the flu shot.    Follow these instructions at home:  Medicines   - Take over-the-counter and prescription medicines only as told by your health care provider.  - If you were prescribed an antiviral medicine, take it as told by your health care provider. Do not stop taking the antiviral even if you start to feel better.  - Be aware of when antibiotics are needed and when they are not needed. Antibiotics do not treat viruses. You may get an antibiotic if your health care provider thinks that you may have, or are at risk for, a bacterial infection and you have a viral infection.  - Do not ask for an antibiotic prescription if you have been diagnosed with a viral illness. Antibiotics will not make your illness go away faster.  - Frequently taking antibiotics when they are not needed can lead to antibiotic resistance. When this develops, the medicine no longer works against the bacteria that it normally fights.    General instructions   - Drink enough fluids to keep your urine pale yellow.  - Rest as much as possible.  - Return to your normal activities as told by your health care provider. Ask your health care provider what activities are safe for you.  - Keep all follow-up visits as told by your health care provider. This is important.    How is this prevented?  To reduce your risk of viral illness:  - Wash your hands often with soap and water for at least 20 seconds. If soap and water are not available, use hand .  - Avoid touching your nose, eyes, and mouth, especially if you have not washed your hands recently.  - If anyone in your household has a viral infection, clean all household surfaces that may have been in contact with the virus. Use soap and hot water. You may also use bleach that you have added water to (diluted).  - Stay away from people who are sick with symptoms of a viral infection.  - Do not share items such as toothbrushes and water bottles with other people.  - Keep your vaccinations up to date. This includes getting a yearly flu shot.  - Eat a healthy diet and get plenty of rest.    Contact a health care provider if:  - You have symptoms of a viral illness that do not go away.  - Your symptoms come back after going away.  - Your symptoms get worse.    Get help right away if you have:  - Trouble breathing.  - A severe headache or a stiff neck.  - Severe vomiting or pain in your abdomen.  These symptoms may represent a serious problem that is an emergency. Do not wait to see if the symptoms will go away. Get medical help right away. Call your local emergency services (911 in the [place] Do not drive yourself to the hospital.     Summary  - Viruses are types of germs that can get into a person's body and cause illness. Viral illnesses can range from mild to severe. They can affect various parts of the body.  - Viruses can be hard to treat. There are medicines to relieve symptoms, and there are some antiviral medicines.  - If you were prescribed an antiviral medicine, take it as told by your health care provider. Do not stop taking the antiviral even if you start to feel better.  - Contact a health care provider if you have symptoms of a viral illness that do not go away.    This information is not intended to replace advice given to you by your health care provider. Make sure you discuss any questions you have with your health care provider.  Document Revised: 05/03/2021 Document Reviewed: 10/27/2020  Elsevier Patient Education © 2022 [employer]

## 2024-08-15 NOTE — ED PROVIDER NOTE - ATTENDING CONTRIBUTION TO CARE
33-year-old female presents for evaluation of nonproductive cough for past week.  Patient today reported feeling slightly dizzy and near syncopal episode.  Denies any chest pain, palpitations, dizziness.  States she was sitting out in the sun all day and when she got up from laying down she felt sudden weakness and sat down the floor.  Denies full LOC but felt like she was going to fully pass out.  Patient rested and then was able to get up and ambulate as usual. Reports feeling weakness and fatigue. Denies any headache, focal weakness, paresthesias, visual changes, chest pain, shortness of breath, palpitations, nausea, vomiting, diarrhea, abdominal pain or urinary complaints.    VITAL SIGNS: noted  CONSTITUTIONAL: Well-developed; well-nourished; in no acute distress  HEAD: Normocephalic; atraumatic  EYES: PERRL, EOM intact; conjunctiva and sclera clear  ENT: No nasal discharge; airway clear. MMM  NECK: Supple; non tender. No anterior cervical lymphadenopathy noted  CARD: S1, S2 normal; no murmurs, gallops, or rubs. Regular rate and rhythm  RESP: CTAB/L, no wheezes, rales or rhonchi  ABD: Normal bowel sounds; soft; non-distended; non-tender; no hepatosplenomegaly. No CVA tenderness  EXT: Normal ROM. No calf tenderness or edema. Distal pulses intact  NEURO: Alert, oriented. Grossly unremarkable. No focal deficits  SKIN: Skin exam is warm and dry, no acute rash  MS: No midline spinal tenderness

## 2024-08-15 NOTE — ED PROVIDER NOTE - PHYSICAL EXAMINATION
CONSTITUTIONAL: NAD  SKIN: Warm dry  HEAD: NCAT  EYES: NL inspection  ENT: MMM  CARD: RRR  RESP: CTAB, no tenderness appreciated  ABD: Soft, non tender, no rebound or guarding   EXT: no pedal edema  NEURO: Grossly unremarkable  PSYCH: Cooperative, appropriate.

## 2024-08-15 NOTE — ED PROVIDER NOTE - OBJECTIVE STATEMENT
33-year-old female no notable past medical history coming in with complaints of passing out.  Patient reports that she was out in the sun all day, when she got up from laying down to bed she felt a sudden wave of feet, collapsed on the floor, did not lose consciousness but required rest before she was able to gather self to get up.  Still feeling the symptoms.  Of note, patient has had a cough for the past 7 days with loss of voice, however those have been progressively improving.  LMP was on the 19th through 25th.

## 2024-08-15 NOTE — ED PROVIDER NOTE - PROGRESS NOTE DETAILS
SG: Patient assessed at bedside.  Patient feeling better.  Amenable to discharge and following up outpatient.

## 2024-08-15 NOTE — ED PROVIDER NOTE - PATIENT PORTAL LINK FT
You can access the FollowMyHealth Patient Portal offered by [facility] by registering at the following website: [email or web address]. By joining [employer]’s FollowMyHealth portal, you will also be able to view your health information using other applications (apps) compatible with our system.

## 2024-08-15 NOTE — ED ADULT NURSE NOTE - NS ED NURSE RECORD ANOTHER VITAL SIGN
Spoke with patient she was counseled about prevention of covid 19 and precautions  Also went over the echocardiogram report in details and advised to f.u with Dr. Chilel about that, a copy will be mail to patient as she requested  Check with cardiologist in 1 week or more to see if monitor results are back, placed in mail yesterday per patient   Yes

## 2024-08-15 NOTE — ED PROVIDER NOTE - CLINICAL SUMMARY MEDICAL DECISION MAKING FREE TEXT BOX
Patient evaluated for near syncopal episode and cough, labs reviewed, x-ray without acute findings, EKG sinus.  Patient reported feeling improvement of symptoms and wants to go home.  Advise close follow-up outpatient for reassessment and patient agreed.  Strict return precautions advised and patient verbalized understanding.

## 2024-08-15 NOTE — ED ADULT NURSE NOTE - NS ED NOTE  TALK SOMEONE YN
No
Detail Level: Simple
Note Text (......Xxx Chief Complaint.): This diagnosis correlates with the
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  Pt treated with 5FU, area healed well

## 2024-08-16 NOTE — CHART NOTE - NSCHARTNOTEFT_GEN_A_CORE
[facility] MRN [id number] / No answer 8/15 - JL / Left message 8/16    Specialty: PCP [facility] MRN [id number] / No answer 8/15 - JL / Left message 8/16 / Pt call back and has established care with PCP 8/16 - JL    Specialty: PCP

## 2024-10-10 NOTE — ED ADULT NURSE NOTE - CHIEF COMPLAINT QUOTE
Beatris Ponce female   1956 68 y.o.   [id number]           HPI  Beatris Ponce is a 68 y.o.  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 2005 right breast excisional biopsy indicate atypical ductal hyperplasia (ADH). She has history fibrocystic changes and previous breast biopsies, right axillary lymph node biopsy dating back to . She declines endocrine therapy & breast MRI in the past. She has severely claustrophobic.     2023 68 gene Custom Cancer Panel negative for pathogenic mutation.      2015 she had total abdominal hysterectomy with bilateral salpingo-oophorectomy for large uterine fibroids. She takes methotrexate for rheumatoid arthritis.     She vacationed in [place]. 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     PERSONAL CANCER HISTORY  T2N1 adenocarcinoma of the pancreas S/p whipple procedure on 10/6/2023. Neoadjuvant modified FOLFIRINOX: Started 2023  Adjuvant FOLFIRINOX: 2023-2024  She is stable with no evidence of recurrence.    BREAST IMAGIN2024 bilateral screening mammogram, BI-RADs Category 1. No breast MRIs performed.     REPRODUCTIVE HISTORY: menarche age 15, nullipara, menopause age 51, no HRT, 2 breast biopsies, 1 with ADH, heterogeneously dense breast tissue     FAMILY CANCER HISTORY:   Mother: Nonmelanoma skin cancer  Father: Nonmelanoma skin cancer, prostate cancer, bladder cancer  Maternal uncle: Colon cancer  Maternal uncle (2) prostate cancer    REVIEW OF SYSTEMS    Constitutional:  Negative for appetite change, fatigue, fever and unexpected weight change.   HENT:  Negative for ear pain, hearing loss, nosebleeds, sore throat and trouble swallowing.    Eyes:  Negative for discharge, itching and visual disturbance.   Respiratory:  Negative for cough, chest tightness and shortness of breath.    Cardiovascular:  Negative for chest pain, palpitations and leg swelling.    Breast: as indicated in HPI  Gastrointestinal:  Negative for abdominal pain, constipation, diarrhea and nausea.   Endocrine: Negative for cold intolerance and heat intolerance.   Genitourinary:  Negative for dysuria, frequency, hematuria, pelvic pain and vaginal bleeding.   Musculoskeletal:  Negative for arthralgias, back pain, gait problem, joint swelling and myalgias.   Skin:  Negative for color change and rash.   Allergic/Immunologic: Negative for environmental allergies and food allergies.   Neurological:  Negative for dizziness, tremors, speech difficulty, weakness, numbness and headaches.   Hematological:  Does not bruise/bleed easily.   Psychiatric/Behavioral:  Negative for agitation, dysphoric mood and sleep disturbance. The patient is not nervous/anxious.         MEDICATIONS  Current Outpatient Medications   Medication Instructions    acetaminophen (TYLENOL) 500 mg, oral, Every 6 hours PRN    amLODIPine (NORVASC) 2.5 mg, oral, Daily    calcium carbonate-mag hydroxid 1,000-200 mg tablet,chewable 1,000 mg, oral, Daily PRN    carboxymethylcellulose (Refresh Plus) 0.5 % ophthalmic solution 1 drop, Both Eyes, As needed    cholecalciferol (VITAMIN D-3) 2,000 Units, oral, Daily RT    ciclopirox (Loprox) 0.77 % gel 1 Application, Topical, Daily RT    fluticasone (Flonase) 50 mcg/actuation nasal spray 1 spray, Each Nostril, Daily, Shake gently. Before first use, prime pump. After use, clean tip and replace cap.    hydroxychloroquine (Plaquenil) 200 mg tablet TAKE 1 & 1/2 (ONE AND ONE-HALF) TABLETS (300 mg) by mouth once daily.    multivitamin/iron/folic acid (CENTRUM WOMEN ORAL) 1 tablet, oral, Daily    omeprazole (PRILOSEC) 20 mg, oral, 2 times daily before meals    pancrelipase, Lip-Prot-Amyl, (Creon) 36,000-114,000- 180,000 unit capsule,delayed release(DR/EC) capsule 1 capsule, oral, 3 times daily (morning, midday, late afternoon)    Restasis 0.05 % ophthalmic emulsion 1 drop, ophthalmic (eye), Every 12 hours     vit A/vit C/vit E/zinc/copper (PRESERVISION AREDS ORAL) 1 capsule, oral, 2 times daily        ALLERGIES  Allergies   Allergen Reactions    Penicillins Rash     Skin test negative 3/20/14.  Oral challenge is deferred   rash    Skin test negative 3/20/14.  Oral challenge is deferred      3.14.17-As last option preferred by pt    Sulfa (Sulfonamide Antibiotics) Swelling     hives        SOCIAL HISTORY  Social History     Tobacco Use    Smoking status: Never    Smokeless tobacco: Never   Substance Use Topics    Alcohol use: Never       Patient Active Problem List    Diagnosis Date Noted    Tear film insufficiency 06/17/2024    Mass of pancreas ([facility]) 06/17/2024    History of herpes zoster 06/17/2024    Rash 06/17/2024    Loose stools 06/05/2024    History of pancreatic cancer 06/05/2024    Acute renal insufficiency 04/26/2024    Bilateral vitreous detachment 04/26/2024    Cough 04/26/2024    Dense breasts 04/26/2024    Dyslipidemia 04/26/2024    Epiretinal membrane (ERM) of left eye 04/26/2024    Essential hypertension 04/26/2024    Fever 04/26/2024    History of colon polyps 04/26/2024    Lower respiratory infection (e.g., bronchitis, pneumonia, pneumonitis, pulmonitis) 04/26/2024    Lumbar strain 04/26/2024    Macular edema of left eye 04/26/2024    Migraine with aura, not intractable, without status migrainosus 04/26/2024    Vitamin D deficiency 04/26/2024    BMI 21.0-21.9, adult 04/26/2024    Chemotherapy-induced neuropathy (Multi) 04/23/2024    Diabetes mellitus due to underlying condition without complication, without long-term current use of insulin (Multi) 01/02/2024    History of Whipple procedure 10/24/2023    Alkaline phosphatase elevation 09/06/2023    Altered taste 09/06/2023    Neuropathy 09/06/2023    Monocytosis 09/06/2023    Normocytic normochromic anemia 09/06/2023    Malignant neoplasm of head of pancreas (Multi) 05/26/2023    Osteopenia, senile 10/04/2022    GERD (gastroesophageal reflux  disease) 09/16/2020    Onychomycosis 09/16/2020    S/P abdominal hysterectomy 12/17/2018    H/O bilateral salpingectomy 02/05/2018    Dense breast tissue 01/25/2008    Rheumatoid arthritis 01/25/2008     Past Medical History:   Diagnosis Date    COVID-19     COVID-19 virus infection    COVID-19 07/25/2022    COVID-19 virus infection    Dry eye syndrome of unspecified lacrimal gland     Dry eye syndrome    Mammographic microcalcification found on diagnostic imaging of breast 04/22/2019    Abnormal mammogram with microcalcification    Personal history of other infectious and parasitic diseases     History of herpes zoster    Personal history of other specified conditions     History of fibrocystic disease of breast    Unspecified benign mammary dysplasia of unspecified breast 02/06/2017    Atypical ductal hyperplasia of breast      Past Surgical History:   Procedure Laterality Date    BREAST BIOPSY Left 2005    Benign excisional biopsy    BREAST BIOPSY Right 2007    Benign core biopsy    BREAST BIOPSY Right 2005    Benign excisional lymph node biopsy    OTHER SURGICAL HISTORY  11/14/2012    Breast Biopsy During Breast Surgery    OTHER SURGICAL HISTORY  10/14/2015    Superficial Axillary Lymph Node Biopsy    TOTAL ABDOMINAL HYSTERECTOMY W/ BILATERAL SALPINGOOPHORECTOMY  02/06/2017    Total Abdominal Hysterectomy With Removal Of Both Ovaries      No family history on file.       VITALS  Vitals:    10/12/24 0756   BP: 149/79   Temp: 36.9 °C (98.5 °F)        PHYSICAL EXAM  Patient is alert and oriented in a relaxed appropriate and very pleasant mood. Her gait is steady. Her hand grasps are equal. Sclera clear. Her breasts are full and slightly asymmetrical, right larger than left. She has a well-healed incision in the central lateral breast from her previous excisional biopsy. There is a slight divot of tissue void from her surgery however I am unable to appreciate any palpable abnormalities or discrete nodules. Right  axilla with well-healed incision from previous axillary biopsy. Her skin and nipples are normal. Her right breast is unremarkable soft tissue throughout. There is no cervical, supraclavicular, or axillary lymphadenopathy. Heart normal S1-S2 appreciated. Lungs clear to auscultation bilaterally. Abdomen soft, nontende       Physical Exam  Chest:              IMAGING    Time was spent viewing digital images of the radiology testing with the patient. I explained the results in depth, along with suggested explanation for follow up recommendations based on the testing results.      RISK PROFILE      ORDERS  7/12/2025 bilateral screening mammogram with tomosynthesis        ASSESSMENT/PLAN  1. Encounter for screening mammogram for malignant neoplasm of breast        2. Breast cancer screening, high risk patient             Follow up in about 1 year (around 10/12/2025) for 1 year for clinic exam.    HIGH RISK PLAN  Yearly mammogram with digital breast tomosynthesis July in [facility]  Twice yearly clinical breast examinations  Breast MRI (to schedule call [phone]) is optional not strongly recommend due to multiple scans for pancreatic surveillance  Monthly self breast examinations &/or regular self breast awareness  Vitamin D3 2000 IU/daily (over the counter) unless your PCP recommends you take a specific dose  Exercise 3-4 times per week for 45-60 minutes  Limit alcohol to 3-4 drinks per week if you are menopausal  Eat healthy low-fat diet with lots of vegetable & fruits  Risk model indicate personal risk of breast cancer in the next 5 years and lifetime (age 85-90):  [place]: 5-year risk 12.4% (average 1.7%), lifetime risk 32.5%, (average 5.2%)        Elizabeth Anglin, [facility]   left toe pain 1st metatarsal, s/p dropping a candle paula onto toe.

## 2024-10-17 NOTE — ED PROVIDER NOTE - DISPOSITION TYPE
Addended by: BARBARA LUCIA on: 10/17/2024 11:47 AM     Modules accepted: Level of Service     DISCHARGE

## 2024-11-19 ENCOUNTER — EMERGENCY (EMERGENCY)
Facility: HOSPITAL | Age: 34
LOS: 0 days | Discharge: HOME | End: 2024-11-19
Attending: EMERGENCY MEDICINE
Payer: COMMERCIAL

## 2024-11-19 VITALS
TEMPERATURE: 98 F | DIASTOLIC BLOOD PRESSURE: 87 MMHG | WEIGHT: 154.98 LBS | HEART RATE: 88 BPM | SYSTOLIC BLOOD PRESSURE: 126 MMHG | RESPIRATION RATE: 19 BRPM | OXYGEN SATURATION: 99 %
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
## 2024-11-19 DIAGNOSIS — S62.639A DISPLACED FRACTURE OF DISTAL PHALANX OF UNSPECIFIED FINGER, INITIAL ENCOUNTER FOR CLOSED FRACTURE: ICD-10-CM

## 2024-11-19 DIAGNOSIS — S60.042A CONTUSION OF LEFT RING FINGER WITHOUT DAMAGE TO NAIL, INITIAL ENCOUNTER: ICD-10-CM

## 2024-11-19 DIAGNOSIS — S62.633A DISPLACED FRACTURE OF DISTAL PHALANX OF LEFT MIDDLE FINGER, INITIAL ENCOUNTER FOR CLOSED FRACTURE: ICD-10-CM

## 2024-11-19 DIAGNOSIS — S62.635A DISPLACED FRACTURE OF DISTAL PHALANX OF LEFT RING FINGER, INITIAL ENCOUNTER FOR CLOSED FRACTURE: ICD-10-CM
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
## 2024-11-19 DIAGNOSIS — S60.032A CONTUSION OF LEFT MIDDLE FINGER WITHOUT DAMAGE TO NAIL, INITIAL ENCOUNTER: ICD-10-CM

## 2024-11-19 PROCEDURE — 99282 EMERGENCY DEPT VISIT SF MDM: CPT

## 2024-11-19 PROCEDURE — 99284 EMERGENCY DEPT VISIT MOD MDM: CPT

## 2024-11-19 NOTE — ED PROVIDER NOTE - OBJECTIVE STATEMENT
Healthy, vaccinated 33 yo F here for assessment of subungual hematoma to L 3rd and 4th digits. Patient sustained crush injury to digits 48 hours ago, seen at another hospital, diagnosed with raquel fractures of both digits. At the time she had acrylic nails on and could not be assessed for the need for trephination. She was told to remove her nails and return for re-evaluation. She was initially seen in [place] but lives on [address] so came here.    Notes persistent throbbing pain to digits, mostly distal phalanx.

## 2024-11-19 NOTE — ED ADULT NURSE NOTE - CHIEF COMPLAINT QUOTE
Pt presenting to ED with broken L 3rd and 4th digit, c/o pressure to nail beds, was instructed by hospital in [place] to return if pain continued

## 2024-11-19 NOTE — ED PROVIDER NOTE - NSFOLLOWUPINSTRUCTIONS_ED_ALL_ED_FT
You should alternate tylenol and motrin for pain. You can take each individual medication every 6 hours and should offset them by 3 hours.     For example.  Take ibuprofen at 12 noon, then tylenol at 3pm, ibuprofen at 6pm and tylenol at 9pm.       FOLLOW UP WITH YOUR HAND SPECIALIST ON THURSDAY AS SCHEDULED.     Finger Fracture, Adult  A finger fracture is a break in any of the finger bones. Your health care provider may put a splint or cast on your finger so it will not move while it heals.    What are the causes?  The main cause of a finger fracture is an injury from:  Sports.  A fall.  Closing your finger in a drawer or door.  What increases the risk?  Playing sports.  Working with machines.  Having a condition called osteoporosis that causes weak bones.  What are the signs or symptoms?  Pain, bruises, and swelling soon after the injury.  Stiffness.  Exposed bones, in very bad cases. This is called a compound fracture or open fracture.  How is this diagnosed?  A physical exam.  Your medical history.  Your symptoms.  An X-ray.  How is this treated?  Treatment depends on how bad your fracture is. If the bones are still in place, your provider may put your finger in a splint. If many fingers are fractured, you may need a cast. A cast may be placed up to the elbow. This will keep your fingers and hand from moving.    If the bones are out of place, your provider may move them back into place or you may need to have surgery.    You may also need to do physical therapy exercises to help your finger move better and get stronger.    Follow these instructions at home:  If you have a splint that can be taken off:    Hand showing finger splint on fingers and wrist.  Wear the splint as told by your provider. Take it off only if your provider says you can.  Check the skin around it every day. Tell your provider if you see problems.  Loosen the splint if your fingers tingle, are numb, or turn cold and blue.  Keep the splint clean and dry.  If you have a cast that cannot be taken off:    Do not put pressure on any part of the cast until it's hard. This may take a few hours.  Do not stick anything inside it to scratch your skin. Doing that raises your risk of infection.  Check the skin around the cast every day. Tell your provider if you see problems.  You may put lotion on dry skin around the cast. Do not put lotion on the skin underneath the cast.  Keep the cast clean and dry.  Bathing    Do not take baths, swim, or use a hot tub until told. Ask if showers are okay.  If your splint or cast is not waterproof:  Do not let it get wet.  Cover it when you take a bath or shower. Use a cover that does not let any water in.  Managing pain, stiffness, and swelling    If told, put ice on the area.  If you have a splint that you can take off, remove it as told.  Put ice in a plastic bag.  Place a towel between your skin and the bag, or between your cast and the bag.  Leave the ice on for 20 minutes, 2–3 times a day.  If your skin turns bright red, take off the ice right away to prevent skin damage. The risk of damage is higher if you can't feel pain, heat, or cold.  Move your fingers often to reduce stiffness and swelling.  Raise the injured area above the level of your heart while you're sitting or lying down. Use a pillow to support your hand as needed.  Activity    Ask your provider if you should avoid driving or using machines while you're taking your medicine.  Do exercises as told by your provider.  Return to normal activities when you're told. Ask what things are safe for you to do.  Ask when it's safe to drive if you have a splint or cast on your finger.  General instructions    Do not smoke, vape, or use products with nicotine or tobacco in them. These can make healing take longer after surgery. If you need help quitting, talk with your provider.  Take your medicines only as told by your provider.  Keep all follow-up visits. Your provider will need to check how your finger is healing.  Contact a health care provider if:  Your pain or swelling gets worse, even with treatment.  You have trouble moving your finger.  Get help right away if:  Your finger gets numb or turns blue.  This information is not intended to replace advice given to you by your health care provider. Make sure you discuss any questions you have with your health care provider.

## 2024-11-19 NOTE — ED PROVIDER NOTE - PHYSICAL EXAMINATION
VITAL SIGNS: I have reviewed nursing notes and confirm.  CONSTITUTIONAL: Well-developed; well-nourished; in no acute distress.  SKIN: Skin exam is warm and dry, no acute rash.  HEAD: Normocephalic; atraumatic.  EYES: PERRL, EOM intact; conjunctiva and sclera clear.  ENT: No nasal discharge; airway clear.   EXT: Extensive bruising to palmar aspect of L 3rd and 4th digits, trace subungual hematoma to both, limited flexion at DIP due to swelling, pain, good cap refill  NEURO: Alert, oriented. Grossly unremarkable. No focal deficits.  PSYCH: Cooperative, appropriate.

## 2024-11-19 NOTE — ED PROVIDER NOTE - CARE PLAN
1 Principal Discharge DX:	Closed fracture of tuft of distal phalanx of finger  Secondary Diagnosis:	Hematoma, subungual, finger

## 2024-11-19 NOTE — ED PROVIDER NOTE - PATIENT PORTAL LINK FT
You can access the FollowMyHealth Patient Portal offered by [facility] by registering at the following website: [email or web address]. By joining [facility]’s FollowMyHealth portal, you will also be able to view your health information using other applications (apps) compatible with our system.

## 2024-11-19 NOTE — ED ADULT TRIAGE NOTE - GLASGOW COMA SCALE: SCORE, MLM
[FreeTextEntry1] : Luna is a [phone] 
[id number]

## 2024-11-19 NOTE — ED PROVIDER NOTE - CLINICAL SUMMARY MEDICAL DECISION MAKING FREE TEXT BOX
No indication for trephination for trace subungual hematoma. Patient has scheduled follow up with an outside hand specialist on Thursday, no signs of NV compromise.    advised on analgesia, elevation, rest, return precautions.

## 2024-11-19 NOTE — ED ADULT TRIAGE NOTE - CHIEF COMPLAINT QUOTE
Pt presenting to ED with broken L 3rd and 4th digit, c/o pressure to nail beds, was instructed by hospital in [place] to return if pain continued

## 2025-02-04 ENCOUNTER — APPOINTMENT (OUTPATIENT)
Dept: ORTHOPEDIC SURGERY | Facility: CLINIC | Age: 35
End: 2025-02-04
Payer: COMMERCIAL

## 2025-02-04 DIAGNOSIS — S67.195A CRUSHING INJURY OF LEFT RING FINGER, INITIAL ENCOUNTER: ICD-10-CM

## 2025-02-04 DIAGNOSIS — S67.193A CRUSHING INJURY OF LEFT MIDDLE FINGER, INITIAL ENCOUNTER: ICD-10-CM

## 2025-02-04 PROBLEM — Z00.00 ENCOUNTER FOR PREVENTIVE HEALTH EXAMINATION: Status: ACTIVE | Noted: 2025-02-04

## 2025-02-04 PROCEDURE — 99204 OFFICE O/P NEW MOD 45 MIN: CPT

## 2025-02-04 PROCEDURE — 73140 X-RAY EXAM OF FINGER(S): CPT | Mod: LT

## 2025-02-24 ENCOUNTER — APPOINTMENT (OUTPATIENT)
Dept: ORTHOPEDIC SURGERY | Age: 35
End: 2025-02-24

## 2025-05-09 NOTE — ED PROVIDER NOTE - WR INTERPRETATION 1
on the discharge service for the patient. I have reviewed and made amendments to the documentation where necessary. CXR negative - No pneumothorax, No opacities, No free airCXR negative - No infiltrates, No consolidation, No atelectasis seen